# Patient Record
Sex: MALE | Race: WHITE | NOT HISPANIC OR LATINO | URBAN - METROPOLITAN AREA
[De-identification: names, ages, dates, MRNs, and addresses within clinical notes are randomized per-mention and may not be internally consistent; named-entity substitution may affect disease eponyms.]

---

## 2019-08-05 ENCOUNTER — TELEPHONE (OUTPATIENT)
Dept: SCHEDULING | Facility: CLINIC | Age: 74
End: 2019-08-05

## 2019-08-05 NOTE — TELEPHONE ENCOUNTER
Marie calling from Dr. Shaw's office pt's current cardiologist requesting NP appt for discussion of Ablation surgery.    There are no avail appts.  Marie states patient can be reached at cell: [phone]    Marie states she will be faxing over records  Marie can be reached at [phone]

## 2020-05-19 ENCOUNTER — TELEPHONE (OUTPATIENT)
Dept: CARDIOLOGY | Facility: CLINIC | Age: 75
End: 2020-05-19

## 2020-05-19 NOTE — TELEPHONE ENCOUNTER
Caller: Pt  Phone: [phone]   Re: NPV    Mr Champion would like to rs 8/20/19 NPV for Ablation consultation. Referred by cardiologist Dr Shaw in 2019. Please call above.    Dr. Shaw  [phone]

## 2020-05-20 NOTE — TELEPHONE ENCOUNTER
Pt need a sooner appt, offer 6/30 but pt wants to schedule a cath, but pt would like to see Dr. Frank sooner. Pt can be reach at [phone]

## 2020-05-20 NOTE — TELEPHONE ENCOUNTER
Lisseth tell the patient tthat Dr Frank will do him a favor and he can come in tomorrow at 10 am.

## 2020-05-20 NOTE — TELEPHONE ENCOUNTER
No sooner available. Pt was suppose to call us back last August to schedule an appoint and potential ablation. He never called back, He should keep that 6/30 appointment. Let him know we are backed up due to Corona virus. If there are any openings in [place] or [place] or [place] he may be added on there, otherwise he will keep 6/30.

## 2020-05-20 NOTE — TELEPHONE ENCOUNTER
Called patient gave appointment for 6/30/20 patient said he would take the appointment but he's worried because he is in A-Fib and that it feels different. He asked could Dr. Frank do a Telemedicine with him. I told him I would asked.

## 2020-05-20 NOTE — PROGRESS NOTES
Electrophysiology  Outpatient Consult Note         HPI   Rich Champion is a 74 y.o. male who is seen today for further evaluation of ATRIAL FIBRILLATION.  The patient was first noted to have atrial fibrillation back in March 2019.  At that time was found while undergoing prep for colonoscopy.  The patient was started on anticoagulation and eventually underwent cardioversion.  Initial cardioversion times were unsuccessful, but after initiating Multitak and repeat cardioversion he apparently was able to maintain sinus rhythm.    Initially the patient denied any symptoms associated with atrial fibrillation, but after cardioversion felt much better.  Prior to the COVID-19 situation, he was active playing [employer] on a regular basis and felt he had less fatigue when back in normal rhythm.  The patient was maintained on the Multitak and Xarelto, but several months later discontinued the Multitak due to lower extremity leg discomfort that he thought was secondary to the drug, and then approximately 4 months ago discontinued the Xarelto.  Patient has been referred for consideration of different therapies and ablation last summer, but never followed up.    The patient has started to follow his blood pressure regularly, and noted a week ago that there was a signal on his blood pressure cuff monitor that demonstrated an irregular heartbeat.  In addition, he noted his heart rate was up around 90 to 100 bpm at times which is much higher than it used to be when it was only up 60 to 70 bpm.  Symptomatically, the patient denies any change in symptoms since he noted the change in heart rate, but has been much less active since he has been staying at home.    Past Medical History:   Diagnosis Date   • Chronic atrial fibrillation (CMS/HCC)    • Kidney stones        Past Surgical History:   Procedure Laterality Date   • COSMETIC SURGERY     • SKIN BIOPSY         Allergies: Patient has no known allergies.    Current Outpatient  Medications   Medication Sig Dispense Refill   • rivaroxaban (XARELTO) 20 mg tablet Take 1 tablet (20 mg total) by mouth daily with dinner.     • tamsulosin (FLOMAX) 0.4 mg capsule Take 1 capsule (0.4 mg total) by mouth nightly. 90 capsule 1     No current facility-administered medications for this visit.        Social History     Tobacco Use   • Smoking status: Never Smoker   • Smokeless tobacco: Never Used   Substance Use Topics   • Alcohol use: Never     Frequency: Never   • Drug use: Not on file       Family History   Problem Relation Age of Onset   • Lung cancer Biological Mother    • Alzheimer's disease Biological Father        Review of Systems   Constitution: Negative for malaise/fatigue.   HENT: Negative for hearing loss.    Eyes: Negative for visual disturbance.   Cardiovascular: Negative for chest pain, dyspnea on exertion, irregular heartbeat, leg swelling, near-syncope, orthopnea, palpitations, paroxysmal nocturnal dyspnea and syncope.   Respiratory: Negative for cough and shortness of breath.    Hematologic/Lymphatic: Negative for bleeding problem.   Skin: Negative for rash.   Musculoskeletal: Negative for joint pain and muscle weakness.   Gastrointestinal: Negative for abdominal pain.   Genitourinary: Negative for hematuria.   Neurological: Negative for focal weakness, paresthesias, tremors and weakness.   Psychiatric/Behavioral: Negative for altered mental status.       Objective     Vitals:    05/21/20 1057   BP: 130/78   Pulse: 81   Resp: 17   SpO2: 98%       Physical Exam   Constitutional: He is oriented to person, place, and time. He appears well-developed and well-nourished.   HENT:   Head: Normocephalic and atraumatic.   Eyes: Pupils are equal, round, and reactive to light. Conjunctivae are normal.   Neck: Normal range of motion.   Cardiovascular: Normal rate. An irregularly irregular rhythm present.   Murmur heard.   Harsh midsystolic murmur is present at the upper right sternal  border.  Pulmonary/Chest: Breath sounds normal. He has no wheezes. He has no rales.   Abdominal: Soft. Bowel sounds are normal. He exhibits no mass. There is no tenderness.   Musculoskeletal: Normal range of motion. He exhibits no edema or deformity.   Neurological: He is alert and oriented to person, place, and time. No cranial nerve deficit.   Skin: Skin is warm and dry. No rash noted.   Psychiatric: He has a normal mood and affect. His behavior is normal.        Labs   No results found for: WBC, HGB, HCT, PLT, CHOL, TRIG, HDL, LDLDIRECT, ALT, AST, NA, K, CL, CREATININE, BUN, CO2, TSH, INR, HGBA1C    IMAGING  Echocardiogram performed in March 2019 showed normal left ventricular size with borderline LV function ejection fraction of 50 to 55%.  There is moderate hypertrophy.  There is a functional bicuspid aortic valve with a gradient of only 6 mmHg, and valve area of 2.0 cm².  No other significant valvular abnormalities were noted.    ECG   Atrial fibrillation with controlled ventricular rate and minor nonspecific ST T abnormalities.    Assessment/Plan   Problem List Items Addressed This Visit        Circulatory    Atrial fibrillation (CMS/HCC) - Primary     Patient presents with recurrence of atrial fibrillation of unclear duration.  He was first noted to be in A. fib back in March 2019, underwent cardioversion and initiation of Multitak and was able to maintain sinus rhythm for at least 6 months.  Because of side effects, he did discontinue the Multitak, and about 4 months ago discontinued the Xarelto as he did not realize this was to be a long-term medication.    At some point he developed recurrence of his atrial fibrillation.  This was noted about a week or 2 ago when he saw a change in his heart rate with blood pressure cuff monitoring, although reviewing his blood pressure cuff readings that were stored on the device, I suspect the A. fib probably has been ongoing for the better part of at least a month or  so.    Symptomatically, he noted a marked improvement following his cardioversion in the past, but is unaware of any significant change in symptoms at this time although this may in part be related to his limited physical activity at this time.    We discussed multiple options of therapy, including different antiarrhythmic agents as well as catheter-based ablation.  At this time I think it is premature to consider these options into his been fully anticoagulated for at least a month I would also like him to return to his previous activities to see if he is symptomatic or not.  If he truly is asymptomatic, then rhythm control strategy may not be warranted.  If he does note fatigue and tiredness with increasing his physical activity over the next couple of months, then we can consider repeat cardioversion and other different antiarrhythmic agent and/or ablation at that time.         Relevant Medications    rivaroxaban (XARELTO) 20 mg tablet    Other Relevant Orders    ECG 12 LEAD-OFFICE PERFORMED (Completed)          Elías Frank MD  5/21/2020

## 2020-05-21 ENCOUNTER — OFFICE VISIT (OUTPATIENT)
Dept: CARDIOLOGY | Facility: CLINIC | Age: 75
End: 2020-05-21
Payer: COMMERCIAL

## 2020-05-21 VITALS
WEIGHT: 206.4 LBS | DIASTOLIC BLOOD PRESSURE: 78 MMHG | SYSTOLIC BLOOD PRESSURE: 130 MMHG | RESPIRATION RATE: 17 BRPM | BODY MASS INDEX: 26.49 KG/M2 | OXYGEN SATURATION: 98 % | HEART RATE: 81 BPM | HEIGHT: 74 IN

## 2020-05-21 DIAGNOSIS — I48.91 ATRIAL FIBRILLATION, UNSPECIFIED TYPE (CMS/HCC): Primary | ICD-10-CM

## 2020-05-21 PROCEDURE — 99204 OFFICE O/P NEW MOD 45 MIN: CPT | Performed by: INTERNAL MEDICINE

## 2020-05-21 PROCEDURE — 93000 ELECTROCARDIOGRAM COMPLETE: CPT | Performed by: INTERNAL MEDICINE

## 2020-05-21 RX ORDER — TAMSULOSIN HYDROCHLORIDE 0.4 MG/1
0.4 CAPSULE ORAL NIGHTLY
Qty: 90 CAPSULE | Refills: 1 | COMMUNITY
Start: 2020-05-21

## 2020-05-21 SDOH — HEALTH STABILITY: MENTAL HEALTH: HOW OFTEN DO YOU HAVE A DRINK CONTAINING ALCOHOL?: NEVER

## 2020-05-21 ASSESSMENT — ENCOUNTER SYMPTOMS
PND: 0
ABDOMINAL PAIN: 0
FOCAL WEAKNESS: 0
PARESTHESIAS: 0
ORTHOPNEA: 0
SHORTNESS OF BREATH: 0
NEAR-SYNCOPE: 0
ALTERED MENTAL STATUS: 0
IRREGULAR HEARTBEAT: 0
COUGH: 0
WEAKNESS: 0
DYSPNEA ON EXERTION: 0
SYNCOPE: 0
HEMATURIA: 0
TREMORS: 0
PALPITATIONS: 0

## 2020-05-21 NOTE — ASSESSMENT & PLAN NOTE
Patient presents with recurrence of atrial fibrillation of unclear duration.  He was first noted to be in A. fib back in March 2019, underwent cardioversion and initiation of Multitak and was able to maintain sinus rhythm for at least 6 months.  Because of side effects, he did discontinue the Multitak, and about 4 months ago discontinued the Xarelto as he did not realize this was to be a long-term medication.    At some point he developed recurrence of his atrial fibrillation.  This was noted about a week or 2 ago when he saw a change in his heart rate with blood pressure cuff monitoring, although reviewing his blood pressure cuff readings that were stored on the device, I suspect the A. fib probably has been ongoing for the better part of at least a month or so.    Symptomatically, he noted a marked improvement following his cardioversion in the past, but is unaware of any significant change in symptoms at this time although this may in part be related to his limited physical activity at this time.    We discussed multiple options of therapy, including different antiarrhythmic agents as well as catheter-based ablation.  At this time I think it is premature to consider these options into his been fully anticoagulated for at least a month I would also like him to return to his previous activities to see if he is symptomatic or not.  If he truly is asymptomatic, then rhythm control strategy may not be warranted.  If he does note fatigue and tiredness with increasing his physical activity over the next couple of months, then we can consider repeat cardioversion and other different antiarrhythmic agent and/or ablation at that time.

## 2020-05-21 NOTE — LETTER
May 21, 2020     Chip Live MD  [address] [place] [address]   [facility] [address]  [place] [address]    Patient: Rich Champion  YOB: 1945  Date of Visit: 5/21/2020      Dear Dr. Live:    Thank you for referring Rich Champion to me for evaluation. Below are my notes for this consultation.    If you have questions, please do not hesitate to call me. I look forward to following your patient along with you.         Sincerely,        Elías Frank MD        CC: MD Monika Linares Steven A, MD  5/21/2020  4:54 PM  Signed       Electrophysiology  Outpatient Consult Note         HPI   Rich Champion is a 74 y.o. male who is seen today for further evaluation of ATRIAL FIBRILLATION.  The patient was first noted to have atrial fibrillation back in March 2019.  At that time was found while undergoing prep for colonoscopy.  The patient was started on anticoagulation and eventually underwent cardioversion.  Initial cardioversion times were unsuccessful, but after initiating Multitak and repeat cardioversion he apparently was able to maintain sinus rhythm.    Initially the patient denied any symptoms associated with atrial fibrillation, but after cardioversion felt much better.  Prior to the COVID-19 situation, he was active playing [employer] on a regular basis and felt he had less fatigue when back in normal rhythm.  The patient was maintained on the Multitak and Xarelto, but several months later discontinued the Multitak due to lower extremity leg discomfort that he thought was secondary to the drug, and then approximately 4 months ago discontinued the Xarelto.  Patient has been referred for consideration of different therapies and ablation last summer, but never followed up.    The patient has started to follow his blood pressure regularly, and noted a week ago that there was a signal on his blood pressure cuff monitor that demonstrated an irregular heartbeat.  In addition, he noted his heart rate was up  around 90 to 100 bpm at times which is much higher than it used to be when it was only up 60 to 70 bpm.  Symptomatically, the patient denies any change in symptoms since he noted the change in heart rate, but has been much less active since he has been staying at home.    Past Medical History:   Diagnosis Date   • Chronic atrial fibrillation (CMS/HCC)    • Kidney stones        Past Surgical History:   Procedure Laterality Date   • COSMETIC SURGERY     • SKIN BIOPSY         Allergies: Patient has no known allergies.    Current Outpatient Medications   Medication Sig Dispense Refill   • rivaroxaban (XARELTO) 20 mg tablet Take 1 tablet (20 mg total) by mouth daily with dinner.     • tamsulosin (FLOMAX) 0.4 mg capsule Take 1 capsule (0.4 mg total) by mouth nightly. 90 capsule 1     No current facility-administered medications for this visit.        Social History     Tobacco Use   • Smoking status: Never Smoker   • Smokeless tobacco: Never Used   Substance Use Topics   • Alcohol use: Never     Frequency: Never   • Drug use: Not on file       Family History   Problem Relation Age of Onset   • Lung cancer Biological Mother    • Alzheimer's disease Biological Father        Review of Systems   Constitution: Negative for malaise/fatigue.   HENT: Negative for hearing loss.    Eyes: Negative for visual disturbance.   Cardiovascular: Negative for chest pain, dyspnea on exertion, irregular heartbeat, leg swelling, near-syncope, orthopnea, palpitations, paroxysmal nocturnal dyspnea and syncope.   Respiratory: Negative for cough and shortness of breath.    Hematologic/Lymphatic: Negative for bleeding problem.   Skin: Negative for rash.   Musculoskeletal: Negative for joint pain and muscle weakness.   Gastrointestinal: Negative for abdominal pain.   Genitourinary: Negative for hematuria.   Neurological: Negative for focal weakness, paresthesias, tremors and weakness.   Psychiatric/Behavioral: Negative for altered mental status.        Objective     Vitals:    05/21/20 1057   BP: 130/78   Pulse: 81   Resp: 17   SpO2: 98%       Physical Exam   Constitutional: He is oriented to person, place, and time. He appears well-developed and well-nourished.   HENT:   Head: Normocephalic and atraumatic.   Eyes: Pupils are equal, round, and reactive to light. Conjunctivae are normal.   Neck: Normal range of motion.   Cardiovascular: Normal rate. An irregularly irregular rhythm present.   Murmur heard.   Harsh midsystolic murmur is present at the upper right sternal border.  Pulmonary/Chest: Breath sounds normal. He has no wheezes. He has no rales.   Abdominal: Soft. Bowel sounds are normal. He exhibits no mass. There is no tenderness.   Musculoskeletal: Normal range of motion. He exhibits no edema or deformity.   Neurological: He is alert and oriented to person, place, and time. No cranial nerve deficit.   Skin: Skin is warm and dry. No rash noted.   Psychiatric: He has a normal mood and affect. His behavior is normal.        Labs   No results found for: WBC, HGB, HCT, PLT, CHOL, TRIG, HDL, LDLDIRECT, ALT, AST, NA, K, CL, CREATININE, BUN, CO2, TSH, INR, HGBA1C    IMAGING  Echocardiogram performed in March 2019 showed normal left ventricular size with borderline LV function ejection fraction of 50 to 55%.  There is moderate hypertrophy.  There is a functional bicuspid aortic valve with a gradient of only 6 mmHg, and valve area of 2.0 cm².  No other significant valvular abnormalities were noted.    ECG   Atrial fibrillation with controlled ventricular rate and minor nonspecific ST T abnormalities.    Assessment/Plan   Problem List Items Addressed This Visit        Circulatory    Atrial fibrillation (CMS/HCC) - Primary     Patient presents with recurrence of atrial fibrillation of unclear duration.  He was first noted to be in A. fib back in March 2019, underwent cardioversion and initiation of Multitak and was able to maintain sinus rhythm for at least 6  months.  Because of side effects, he did discontinue the Multitak, and about 4 months ago discontinued the Xarelto as he did not realize this was to be a long-term medication.    At some point he developed recurrence of his atrial fibrillation.  This was noted about a week or 2 ago when he saw a change in his heart rate with blood pressure cuff monitoring, although reviewing his blood pressure cuff readings that were stored on the device, I suspect the A. fib probably has been ongoing for the better part of at least a month or so.    Symptomatically, he noted a marked improvement following his cardioversion in the past, but is unaware of any significant change in symptoms at this time although this may in part be related to his limited physical activity at this time.    We discussed multiple options of therapy, including different antiarrhythmic agents as well as catheter-based ablation.  At this time I think it is premature to consider these options into his been fully anticoagulated for at least a month I would also like him to return to his previous activities to see if he is symptomatic or not.  If he truly is asymptomatic, then rhythm control strategy may not be warranted.  If he does note fatigue and tiredness with increasing his physical activity over the next couple of months, then we can consider repeat cardioversion and other different antiarrhythmic agent and/or ablation at that time.         Relevant Medications    rivaroxaban (XARELTO) 20 mg tablet    Other Relevant Orders    ECG 12 LEAD-OFFICE PERFORMED (Completed)          Elías Frank MD  5/21/2020

## 2024-03-26 ENCOUNTER — TELEPHONE (OUTPATIENT)
Age: 79
End: 2024-03-26

## 2024-03-26 NOTE — TELEPHONE ENCOUNTER
Caller: Maury    Doctor: ENEDELIA    Reason for call: Pt is calling to schedule ortho     Call back#: [phone]

## 2024-04-02 ENCOUNTER — APPOINTMENT (OUTPATIENT)
Dept: RADIOLOGY | Facility: CLINIC | Age: 79
End: 2024-04-02
Payer: COMMERCIAL

## 2024-04-02 VITALS — SYSTOLIC BLOOD PRESSURE: 144 MMHG | HEART RATE: 102 BPM | DIASTOLIC BLOOD PRESSURE: 89 MMHG | HEIGHT: 73 IN

## 2024-04-02 DIAGNOSIS — M79.641 RIGHT HAND PAIN: Primary | ICD-10-CM

## 2024-04-02 DIAGNOSIS — M18.11 PRIMARY OSTEOARTHRITIS OF FIRST CARPOMETACARPAL JOINT OF RIGHT HAND: ICD-10-CM

## 2024-04-02 DIAGNOSIS — M79.642 LEFT HAND PAIN: ICD-10-CM

## 2024-04-02 DIAGNOSIS — M65.322 TRIGGER FINGER, LEFT INDEX FINGER: ICD-10-CM

## 2024-04-02 DIAGNOSIS — M15.1 OSTEOARTHRITIS OF DISTAL INTERPHALANGEAL (DIP) JOINT OF LEFT LITTLE FINGER: ICD-10-CM

## 2024-04-02 DIAGNOSIS — M79.641 RIGHT HAND PAIN: ICD-10-CM

## 2024-04-02 DIAGNOSIS — M19.032 LOCALIZED PRIMARY OSTEOARTHRITIS OF CARPOMETACARPAL (CMC) JOINT OF LEFT WRIST: ICD-10-CM

## 2024-04-02 PROCEDURE — 99203 OFFICE O/P NEW LOW 30 MIN: CPT | Performed by: PHYSICIAN ASSISTANT

## 2024-04-02 PROCEDURE — 20550 NJX 1 TENDON SHEATH/LIGAMENT: CPT | Performed by: PHYSICIAN ASSISTANT

## 2024-04-02 PROCEDURE — 73130 X-RAY EXAM OF HAND: CPT

## 2024-04-02 RX ORDER — AMLODIPINE BESYLATE 2.5 MG/1
2.5 TABLET ORAL 2 TIMES DAILY
COMMUNITY
Start: 2024-03-21

## 2024-04-02 RX ORDER — RIVAROXABAN 20 MG/1
20 TABLET, FILM COATED ORAL DAILY
COMMUNITY

## 2024-04-02 RX ORDER — TAMSULOSIN HYDROCHLORIDE 0.4 MG/1
0.4 CAPSULE ORAL
COMMUNITY

## 2024-04-02 RX ORDER — LIDOCAINE HYDROCHLORIDE 10 MG/ML
0.5 INJECTION, SOLUTION INFILTRATION; PERINEURAL
Status: COMPLETED | OUTPATIENT
Start: 2024-04-02 | End: 2024-04-02

## 2024-04-02 RX ORDER — TRIAMCINOLONE ACETONIDE 40 MG/ML
20 INJECTION, SUSPENSION INTRA-ARTICULAR; INTRAMUSCULAR
Status: COMPLETED | OUTPATIENT
Start: 2024-04-02 | End: 2024-04-02

## 2024-04-02 RX ADMIN — LIDOCAINE HYDROCHLORIDE 0.5 ML: 10 INJECTION, SOLUTION INFILTRATION; PERINEURAL at 10:00

## 2024-04-02 RX ADMIN — TRIAMCINOLONE ACETONIDE 20 MG: 40 INJECTION, SUSPENSION INTRA-ARTICULAR; INTRAMUSCULAR at 10:00

## 2024-04-02 NOTE — PROGRESS NOTES
Orthopaedic Surgery - Office Note  Maury Bautista (78 y.o. male)   : 1945   MRN: [id number]  Encounter Date: 2024    No chief complaint on file.        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  Patient was advised the best initial treatment would be to consider a cortisone injection.  If the 1st cortisone injection failed to relieve the symptoms, a 2nd injection could be considered in the future.  If symptoms persist beyond 2 attempted injections, recommendations for follow-up with the hand surgeon to discuss surgical release were reviewed.  All question and concerns were answered in the office today.    The advanced degenerative changes of the left small finger DIP joint were reviewed including the x-ray findings.  As he is without significant pain at this site and has no deficits in activities of daily living related to the finger I would not recommend any further treatment.  Surgery would not be recommended for this finger at this time.    Topical Voltaren gel could be considered as I would not recommend any oral NSAIDs secondary to Xarelto usage.  He may use Tylenol as needed for breakthrough pain.  Occupational Therapy would have limited benefit at this time but may be considered in the future if  "symptoms persist in the right hand.     Return if symptoms worsen or fail to improve-with myself for second trigger finger injection.        History of Present Illness  This is a new patient with right and left hand pain.  He is right-hand dominant.  He reports is primary pain complaint is the left index finger with trigger like symptoms and pain at the A1 pulley.  He reports the index finger can become locked at times and has difficulty extending it.  No trauma to this finger is noted.  He reports he has chronic nodules on the left small finger that he was curious about.  He reports mild aching pain through his entire right hand without any numbness and tingling in either hand or fingers.  He has not had any treatment.    He is on Xarelto chronically.    Review of Systems  Pertinent items are noted in HPI.  All other systems were reviewed and are negative.    Physical Exam  /89   Pulse 102   Ht 6' 1\" (1.854 m)   Cons: Appears well.  No apparent distress.  Psych: Alert. Oriented x3.  Mood and affect normal.  Patient's left small finger has DIP joint degenerative changes with nodules noted both radial and ulnar with limited range of motion.  He is nontender to palpation at the DIP joint.  Patient's left index finger is tender to palpation at the A1 pulley where there is a palpable nodule and active triggering today in the office.  He has no CMC joint tenderness.  He is otherwise nontender throughout palpation of the left hand and digits.  He is neurovascular grossly intact with a negative Tinel's and Phalen's at the carpal tunnel.  There is no thenar atrophy or wasting.   strength and pinch strength is 5 out of 5.    Patient's right hand is without any significant deformity or tenderness to palpation.  He has no trigger fingers appreciated.  He has no tenderness at the CMC joint.  He has a negative CMC grind test.   strength pinch strength and intrinsic strength is 5 out of 5.  He has a negative " "Tinel's and Phalen's.    Bilateral distal radial ulnar pulses are +2                 Studies Reviewed  Independent review of x-rays performed in the office today of the left hand show no acute fractures or dislocations.  Advanced degenerative changes of the left small finger DIP joint is noted with large osteophyte formations.  Moderate CMC joint arthritis is noted.  X-rays were reviewed from orthopedic standpoint will await official radiologist interpretation.    Independent review of x-rays performed in the office today of the right hand show no acute fractures or dislocations.  Moderate degenerative changes of the CMC joint are noted.  Mild degenerative changes at the DIP joints of all digits were noted.  X-rays were reviewed from orthopedic standpoint will await official radiologist interpretation    Hand/upper extremity injection: L index A1  Universal Protocol:  Consent: Verbal consent obtained.  Risks and benefits: risks, benefits and alternatives were discussed  Consent given by: patient  Time out: Immediately prior to procedure a \"time out\" was called to verify the correct patient, procedure, equipment, support staff and site/side marked as required.  Patient understanding: patient states understanding of the procedure being performed  Patient consent: the patient's understanding of the procedure matches consent given  Relevant documents: relevant documents present and verified  Test results: test results available and properly labeled  Site marked: the operative site was marked  Radiology Images displayed and confirmed. If images not available, report reviewed: imaging studies available  Patient identity confirmed: verbally with patient  Supporting Documentation  Indications: tendon swelling   Procedure Details  Condition:trigger finger Location: index finger - L index A1   Preparation: Patient was prepped and draped in the usual sterile fashion  Needle size: 22 G  Ultrasound guidance: no  Medications " administered: 0.5 mL lidocaine 1 %; 20 mg triamcinolone acetonide 40 mg/mL  Patient tolerance: patient tolerated the procedure well with no immediate complications  Dressing:  Sterile dressing applied         Medical, Surgical, Family, and Social History  The patient's medical history, family history, and social history, were reviewed and updated as appropriate.    History reviewed. No pertinent past medical history.    History reviewed. No pertinent surgical history.    History reviewed. No pertinent family history.    Social History     Occupational History    Not on file   Tobacco Use    Smoking status: Never    Smokeless tobacco: Never   Substance and Sexual Activity    Alcohol use: Not on file    Drug use: Not on file    Sexual activity: Not on file       Not on File      Current Outpatient Medications:     amLODIPine (NORVASC) 2.5 mg tablet, Take 2.5 mg by mouth 2 (two) times a day, Disp: , Rfl:     tamsulosin (FLOMAX) 0.4 mg, Take 0.4 mg by mouth, Disp: , Rfl:     Xarelto 20 MG tablet, Take 20 mg by mouth daily, Disp: , Rfl:       Andrea Juan PA-C

## 2024-06-24 ENCOUNTER — HOSPITAL ENCOUNTER (OUTPATIENT)
Dept: HOSPITAL 99 - PAVMRI | Age: 79
End: 2024-06-24
Payer: COMMERCIAL

## 2024-06-24 DIAGNOSIS — R41.3: Primary | ICD-10-CM

## 2024-10-22 ENCOUNTER — OFFICE VISIT (OUTPATIENT)
Dept: PODIATRY | Facility: CLINIC | Age: 79
End: 2024-10-22
Payer: COMMERCIAL

## 2024-10-22 VITALS
HEIGHT: 73 IN | BODY MASS INDEX: 24.15 KG/M2 | WEIGHT: 182.2 LBS | SYSTOLIC BLOOD PRESSURE: 131 MMHG | DIASTOLIC BLOOD PRESSURE: 79 MMHG | HEART RATE: 84 BPM

## 2024-10-22 DIAGNOSIS — B35.1 ONYCHOMYCOSIS: Primary | ICD-10-CM

## 2024-10-22 DIAGNOSIS — L85.1 PLANTAR POROKERATOSIS, ACQUIRED: ICD-10-CM

## 2024-10-22 PROCEDURE — 99203 OFFICE O/P NEW LOW 30 MIN: CPT | Performed by: PODIATRIST

## 2024-10-22 RX ORDER — TERBINAFINE HYDROCHLORIDE 250 MG/1
250 TABLET ORAL DAILY
Qty: 30 TABLET | Refills: 2 | Status: SHIPPED | OUTPATIENT
Start: 2024-10-22 | End: 2025-01-20

## 2024-10-22 NOTE — PROGRESS NOTES
Ambulatory Visit  Name: Maury Bautista      : 1945      MRN: [id number]  Encounter Provider: Stone Ford DPM  Encounter Date: 10/22/2024   Encounter department: [facility] [place]    Assessment & Plan  Onychomycosis  Treatment options for nail mycosis discussed with patient in detail.  Oral antifungal versus topical antifungal therapy options reviewed and relative success rates of each.  Patient requests trying a course of oral antifungal therapy since he is tired of the difficulty he has with nail care.  Rx CMP to evaluate LFTs prior to initiating oral terbinafine.  Rx Lamisil tablets 250 mg daily x 3 months, patient instructed not to  prescription or initiate this until his lab work has been reviewed.  Recommend follow-up in approximately 3 months.    Orders:    Comprehensive metabolic panel; Future    terbinafine (LamISIL) 250 mg tablet; Take 1 tablet (250 mg total) by mouth daily    Plantar porokeratosis, acquired  Treatment options for painful hyperkeratotic lesion reviewed, surgical excision is not a reasonable alternative for this location and scar tissue is generally an issue if such is attempted.  Recommend patient just continue with palliative care on an as-needed basis when lesion gets painful enough.  Soft forefoot padded shoe gear is recommended as this will slow the rate of recurrence.  Lesion is not a verruca.       History of Present Illness     Maury Bautista is a 79 y.o. male who presents concerned with thick yellow dystrophic nails and painful lesion right foot.  Patient is interested in his treatment options reporting he has great difficulty caring for his nails due to the thickness.    History obtained from : patient  Review of Systems   Constitutional: Negative.    HENT: Negative.     Eyes: Negative.    Respiratory: Negative.     Cardiovascular: Negative.    Gastrointestinal: Negative.    Endocrine: Negative.    Genitourinary: Negative.   "  Musculoskeletal: Negative.    Skin:         Multiple thick yellow nails and painful lesion right foot under fifth toe joint   Allergic/Immunologic: Negative.    Neurological: Negative.    Hematological: Negative.    Psychiatric/Behavioral: Negative.       Patient Active Problem List   Diagnosis    Atrial fibrillation (HCC)    Benign prostatic hyperplasia with urinary obstruction    Essential hypertension        Past Medical History   History reviewed. No pertinent past medical history.  History reviewed. No pertinent surgical history.  History reviewed. No pertinent family history.  Current Outpatient Medications on File Prior to Visit   Medication Sig Dispense Refill    amLODIPine (NORVASC) 2.5 mg tablet Take 2.5 mg by mouth 2 (two) times a day      tamsulosin (FLOMAX) 0.4 mg Take 0.4 mg by mouth      Xarelto 20 MG tablet Take 20 mg by mouth daily       No current facility-administered medications on file prior to visit.   No Known Allergies       Objective     /79 (BP Location: Left arm, Patient Position: Sitting, Cuff Size: Adult)   Pulse 84   Ht 6' 1\" (1.854 m) Comment: stated  Wt 82.6 kg (182 lb 3.2 oz)   BMI 24.04 kg/m²     Physical Exam  Vitals reviewed.   Constitutional:       General: He is not in acute distress.     Appearance: He is well-developed.   HENT:      Head: Normocephalic and atraumatic.      Nose: Nose normal.   Eyes:      Conjunctiva/sclera: Conjunctivae normal.   Cardiovascular:      Rate and Rhythm: Normal rate and regular rhythm.      Pulses:           Dorsalis pedis pulses are 1+ on the right side and 1+ on the left side.        Posterior tibial pulses are 0 on the right side and 0 on the left side.   Pulmonary:      Effort: Pulmonary effort is normal.   Musculoskeletal:         General: No swelling or tenderness. Normal range of motion.      Cervical back: Neck supple.      Right foot: Prominent metatarsal heads (Fifth MPJ) present.   Feet:      Right foot:      Protective " Sensation: 10 sites tested.  10 sites sensed.      Skin integrity: Callus (IPK/porokeratosis) present.      Toenail Condition: Right toenails are abnormally thick. Fungal disease present.     Left foot:      Protective Sensation: 10 sites tested.  10 sites sensed.      Toenail Condition: Left toenails are abnormally thick. Fungal disease present.  Skin:     General: Skin is warm and dry.      Capillary Refill: Capillary refill takes 2 to 3 seconds.      Comments:   Right foot 123 & 5; Left foot 1 2 & 5 nails are all dystrophic with 0.5 cm average thickness, painful to palpation, yellow discoloration, subungual debris, brittle nature, and a fungal odor noted.    Right foot subfifth MPJ painful porokeratotic lesion measuring approximately 0.6 cm², moderate pain with palpation.  Lesion is not a verruca.   Neurological:      General: No focal deficit present.      Mental Status: He is alert and oriented to person, place, and time.   Psychiatric:         Mood and Affect: Mood normal.

## 2024-10-23 ENCOUNTER — TELEPHONE (OUTPATIENT)
Dept: PODIATRY | Facility: CLINIC | Age: 79
End: 2024-10-23

## 2024-10-23 NOTE — TELEPHONE ENCOUNTER
Caller: Maury Wilkerson    Doctor: Sheldon Ford DPM    Reason for call: Maury is at [facility] now.  They need someone to call them to let them know exactly what Dr. Ford is ordering in regards to the comprehensive metabolic panel. They need the order faxed.    Call back#: [phone] ([facility]) They close at 11:30 and ella @ 12:10  Fax # [phone]

## 2024-10-24 ENCOUNTER — TELEPHONE (OUTPATIENT)
Age: 79
End: 2024-10-24

## 2024-10-24 PROBLEM — I48.91 ATRIAL FIBRILLATION (HCC): Status: ACTIVE | Noted: 2020-05-21

## 2024-10-24 PROBLEM — N40.1 BENIGN PROSTATIC HYPERPLASIA WITH URINARY OBSTRUCTION: Status: ACTIVE | Noted: 2019-02-04

## 2024-10-24 PROBLEM — I10 ESSENTIAL HYPERTENSION: Status: ACTIVE | Noted: 2019-05-20

## 2024-10-24 PROBLEM — N13.8 BENIGN PROSTATIC HYPERPLASIA WITH URINARY OBSTRUCTION: Status: ACTIVE | Noted: 2019-02-04

## 2024-10-24 NOTE — TELEPHONE ENCOUNTER
Caller: Maury Bautista    Doctor and/or Office: Dr. Ford/Risa    #: [phone]    Escalation: Care/went to have BW done yesterday at [facility], so asking for call back with results and if  is sending RX it should be sent to [facility] in [place]. Please return his call. Thanks